# Patient Record
Sex: MALE | Race: BLACK OR AFRICAN AMERICAN
[De-identification: names, ages, dates, MRNs, and addresses within clinical notes are randomized per-mention and may not be internally consistent; named-entity substitution may affect disease eponyms.]

---

## 2020-11-08 ENCOUNTER — HOSPITAL ENCOUNTER (EMERGENCY)
Dept: HOSPITAL 35 - ER | Age: 75
LOS: 1 days | Discharge: HOME | End: 2020-11-09
Payer: COMMERCIAL

## 2020-11-08 VITALS — WEIGHT: 139.99 LBS | BODY MASS INDEX: 21.97 KG/M2 | HEIGHT: 67 IN

## 2020-11-08 DIAGNOSIS — R31.29: ICD-10-CM

## 2020-11-08 DIAGNOSIS — Z79.899: ICD-10-CM

## 2020-11-08 DIAGNOSIS — F03.90: ICD-10-CM

## 2020-11-08 DIAGNOSIS — E03.9: ICD-10-CM

## 2020-11-08 DIAGNOSIS — I10: ICD-10-CM

## 2020-11-08 DIAGNOSIS — R53.1: Primary | ICD-10-CM

## 2020-11-08 DIAGNOSIS — Z95.0: ICD-10-CM

## 2020-11-08 LAB
ALBUMIN SERPL-MCNC: 3.2 G/DL (ref 3.4–5)
ALT SERPL-CCNC: 16 U/L (ref 30–65)
ANION GAP SERPL CALC-SCNC: 8 MMOL/L (ref 7–16)
AST SERPL-CCNC: 20 U/L (ref 15–37)
BASOPHILS NFR BLD AUTO: 0.3 % (ref 0–2)
BILIRUB SERPL-MCNC: 1.2 MG/DL (ref 0.2–1)
BILIRUB UR-MCNC: NEGATIVE MG/DL
BUN SERPL-MCNC: 18 MG/DL (ref 7–18)
CALCIUM SERPL-MCNC: 8.9 MG/DL (ref 8.5–10.1)
CHLORIDE SERPL-SCNC: 106 MMOL/L (ref 98–107)
CO2 SERPL-SCNC: 31 MMOL/L (ref 21–32)
COLOR UR: YELLOW
CREAT SERPL-MCNC: 1.7 MG/DL (ref 0.7–1.3)
EOSINOPHIL NFR BLD: 0.2 % (ref 0–3)
ERYTHROCYTE [DISTWIDTH] IN BLOOD BY AUTOMATED COUNT: 16.9 % (ref 10.5–14.5)
GLUCOSE SERPL-MCNC: 142 MG/DL (ref 74–106)
GRANULOCYTES NFR BLD MANUAL: 72.4 % (ref 36–66)
HCT VFR BLD CALC: 38.3 % (ref 42–52)
HGB BLD-MCNC: 12.4 GM/DL (ref 14–18)
KETONES UR STRIP-MCNC: NEGATIVE MG/DL
LYMPHOCYTES NFR BLD AUTO: 16.6 % (ref 24–44)
MAGNESIUM SERPL-MCNC: 2.3 MG/DL (ref 1.8–2.4)
MCH RBC QN AUTO: 26.4 PG (ref 26–34)
MCHC RBC AUTO-ENTMCNC: 32.4 G/DL (ref 28–37)
MCV RBC: 81.3 FL (ref 80–100)
MONOCYTES NFR BLD: 10.5 % (ref 1–8)
MUCUS: (no result) STRN/LPF
NEUTROPHILS # BLD: 4.4 THOU/UL (ref 1.4–8.2)
PLATELET # BLD: 198 THOU/UL (ref 150–400)
POTASSIUM SERPL-SCNC: 3.6 MMOL/L (ref 3.5–5.1)
PROT SERPL-MCNC: 7.2 G/DL (ref 6.4–8.2)
RBC # BLD AUTO: 4.72 MIL/UL (ref 4.5–6)
RBC # UR STRIP: (no result) /UL
SODIUM SERPL-SCNC: 145 MMOL/L (ref 136–145)
SP GR UR STRIP: 1.02 (ref 1–1.03)
TROPONIN I SERPL-MCNC: 0.08 NG/ML (ref ?–0.06)
URINE CLARITY: (no result)
URINE GLUCOSE-RANDOM*: NEGATIVE
URINE LEUKOCYTES-REFLEX: NEGATIVE
URINE NITRITE-REFLEX: NEGATIVE
URINE PROTEIN (DIPSTICK): NEGATIVE
UROBILINOGEN UR STRIP-ACNC: 0.2 E.U./DL (ref 0.2–1)
WBC # BLD AUTO: 6.1 THOU/UL (ref 4–11)

## 2020-11-09 VITALS — SYSTOLIC BLOOD PRESSURE: 140 MMHG | DIASTOLIC BLOOD PRESSURE: 93 MMHG

## 2020-11-09 NOTE — EKG
[facility]
[address]
[place]   [address]                     ELECTROCARDIOGRAM REPORT      
_______________________________________________________________________________
 
Name:       ORALIA CHRISTIANSON              Room #:                     DEP   
MMARY#:      [id number]                       Account #:      [id number]  
Admission:  20    Attend Phys:                          
Discharge:  20    Date of Birth:  [birth year]  
                                                          Report #: [id number]
                                                                    [id number]
_______________________________________________________________________________
THIS REPORT FOR:  
 
cc:  Santana Morales MD, Michael B. MD Santiago, Patrick MD [facility]                                         ~
THIS REPORT FOR:   //name//                          
 
                         [facility] ED
                                       
Test Date:    2020               Test Time:    21:00:34
Pat Name:     ORALIA CHRISTIANSON         Department:   
Patient ID:   [id number]            Room:          
Gender:                               Technician:   
:          1945               Requested By: Jay Morales
Order Number: [id number] MD:   Azeem Martínez
                                 Measurements
Intervals                              Axis          
Rate:         78                       P:            82
NV:           347                      QRS:          98
QRSD:         168                      T:            -70
QT:           491                                    
QTc:          560                                    
                           Interpretive Statements
Ventricular-paced complexes
No further analysis attempted due to paced rhythm
Compared to ECG 2012 14:33:01
Sinus rhythm no longer present
Ventricular premature complex(es) no longer present
Left-axis deviation no longer present
Left ventricular hypertrophy no longer present
Electronically Signed On 2020 8:42:48 CST by Azeem Martínez
[email or web address]
 
 
 
 
 
 
 
 
 
 
 
 
  <ELECTRONICALLY SIGNED>
   By: Azeem Martínez MD, FACC    
  20     0842
D: 20 2100                           _____________________________________
T: 20 2100                           Azeem Martínez MD, FACC      /EPI